# Patient Record
Sex: FEMALE | Race: BLACK OR AFRICAN AMERICAN | NOT HISPANIC OR LATINO
[De-identification: names, ages, dates, MRNs, and addresses within clinical notes are randomized per-mention and may not be internally consistent; named-entity substitution may affect disease eponyms.]

---

## 2018-04-12 ENCOUNTER — APPOINTMENT (OUTPATIENT)
Dept: RADIOLOGY | Facility: CLINIC | Age: 38
End: 2018-04-12

## 2018-04-12 ENCOUNTER — OUTPATIENT (OUTPATIENT)
Dept: OUTPATIENT SERVICES | Facility: HOSPITAL | Age: 38
LOS: 1 days | End: 2018-04-12
Payer: MEDICAID

## 2018-04-12 DIAGNOSIS — Z98.89 OTHER SPECIFIED POSTPROCEDURAL STATES: Chronic | ICD-10-CM

## 2018-04-12 DIAGNOSIS — M25.511 PAIN IN RIGHT SHOULDER: ICD-10-CM

## 2018-04-12 PROCEDURE — 73030 X-RAY EXAM OF SHOULDER: CPT

## 2018-04-12 PROCEDURE — 73030 X-RAY EXAM OF SHOULDER: CPT | Mod: 26,RT

## 2019-12-02 ENCOUNTER — EMERGENCY (EMERGENCY)
Facility: HOSPITAL | Age: 39
LOS: 1 days | Discharge: HOME | End: 2019-12-02
Attending: EMERGENCY MEDICINE | Admitting: EMERGENCY MEDICINE
Payer: MEDICAID

## 2019-12-02 VITALS
HEIGHT: 66 IN | SYSTOLIC BLOOD PRESSURE: 134 MMHG | TEMPERATURE: 98 F | RESPIRATION RATE: 16 BRPM | WEIGHT: 209 LBS | OXYGEN SATURATION: 100 % | HEART RATE: 83 BPM | DIASTOLIC BLOOD PRESSURE: 86 MMHG

## 2019-12-02 DIAGNOSIS — Z98.89 OTHER SPECIFIED POSTPROCEDURAL STATES: Chronic | ICD-10-CM

## 2019-12-02 PROCEDURE — 73080 X-RAY EXAM OF ELBOW: CPT

## 2019-12-02 PROCEDURE — 73030 X-RAY EXAM OF SHOULDER: CPT

## 2019-12-02 PROCEDURE — 99284 EMERGENCY DEPT VISIT MOD MDM: CPT

## 2019-12-02 PROCEDURE — 73080 X-RAY EXAM OF ELBOW: CPT | Mod: 26,LT

## 2019-12-02 PROCEDURE — 99283 EMERGENCY DEPT VISIT LOW MDM: CPT

## 2019-12-02 PROCEDURE — 73030 X-RAY EXAM OF SHOULDER: CPT | Mod: 26,RT

## 2019-12-02 RX ORDER — IBUPROFEN 200 MG
600 TABLET ORAL ONCE
Refills: 0 | Status: COMPLETED | OUTPATIENT
Start: 2019-12-02 | End: 2019-12-02

## 2019-12-02 RX ADMIN — Medication 600 MILLIGRAM(S): at 15:27

## 2019-12-02 RX ADMIN — Medication 600 MILLIGRAM(S): at 15:59

## 2019-12-02 NOTE — ED PROVIDER NOTE - CARE PLAN
Principal Discharge DX:	Bursitis of elbow, unspecified bursa, unspecified laterality Principal Discharge DX:	Bursitis of elbow, unspecified bursa, unspecified laterality  Secondary Diagnosis:	Right shoulder pain, unspecified chronicity

## 2019-12-02 NOTE — ED PROVIDER NOTE - OBJECTIVE STATEMENT
39 yr old female with hx of right shoulder bursitiis, presents with right shoulder and left elbow pain for the last 6 weeks. Pt has not seen ortho. denies any fall or injury. Denies taking any pain medications.

## 2019-12-02 NOTE — ED PROVIDER NOTE - PATIENT PORTAL LINK FT
You can access the FollowMyHealth Patient Portal offered by [facility] by registering at the following website: [email or web address]. By joining [facility] FollowMyHealth portal, you will also be able to view your health information using other applications (apps) compatible with our system.

## 2019-12-02 NOTE — ED PROVIDER NOTE - NSFOLLOWUPINSTRUCTIONS_ED_ALL_ED_FT
1. Ibuprofen 400mg every 6 hours on a full stomach as needed for pain  2. Follow up with ortho in 1-2 days  3. Return to the ED for worsening pain, fevers or any concerns  ************  Bursitis     Bursitis is inflammation and irritation of a bursa, which is one of the small, fluid-filled sacs that cushion and protect the moving parts of your body. These sacs are located between bones and muscles, bones and muscle attachments, or bones and skin areas that are next to bones. A bursa protects those structures from the wear and tear that results from frequent movement.  An inflamed bursa causes pain and swelling. Fluid may build up inside the sac. Bursitis is most common near joints, especially the knees, elbows, hips, and shoulders.  What are the causes?  This condition may be caused by:  Injury from:  A direct hit (blow), like falling on your knee or elbow.Overuse of a joint (repetitive stress).Infection. This can happen if bacteria get into a bursa through a cut or scrape near a joint.Diseases that cause joint inflammation, such as gout and rheumatoid arthritis.What increases the risk?  You are more likely to develop this condition if you:  Have a job or hobby that involves a lot of repetitive stress on your joints.Have a condition that weakens your body's defense system (immune system), such as diabetes, cancer, or HIV.Do any of these often:  Lift and reach overhead.Kneel or lean on hard surfaces.Run or walk.What are the signs or symptoms?  The most common symptoms of this condition include:  Pain that gets worse when you move the affected body part or use it to support (bear) your body weight.Inflammation.Stiffness.Other symptoms include:  Redness.Swelling.Tenderness.Warmth.Pain that continues after rest.Fever or chills. These may occur in bursitis that is caused by infection.How is this diagnosed?  This condition may be diagnosed based on:  Your medical history and a physical exam.Imaging tests, such as an MRI.A procedure to drain fluid from the bursa with a needle (aspiration). The fluid may be checked for signs of infection or gout.Blood tests to rule out other causes of inflammation.How is this treated?  This condition can usually be treated at home with rest, ice, applying pressure (compression), and raising the body part that is affected (elevation). This is called RICE therapy. For mild bursitis, RICE therapy may be all you need. Other treatments may include:  NSAIDs to treat pain and inflammation.Corticosteroid medicines to fight inflammation. These medicines may be injected into and around the area of bursitis.Aspiration of fluid from the bursa to relieve pain and improve movement.Antibiotic medicine to treat an infected bursa.A splint, brace, or walking aid, such as a cane.Physical therapy if you continue to have pain or limited movement.Surgery to remove a damaged or infected bursa. This may be needed if other treatments have not worked.Follow these instructions at home:  Medicines     Take over-the-counter and prescription medicines only as told by your health care provider.If you were prescribed an antibiotic medicine, take it as told by your health care provider. Do not stop taking the antibiotic even if you start to feel better.General instructions        Rest the affected area as told by your health care provider.  If possible, raise (elevate) the affected area above the level of your heart while you are sitting or lying down.Avoid activities that make pain worse.Use splints, braces, pads, or walking aids as told by your health care provider.If directed, put ice on the affected area:  If you have a removable splint or brace, remove it as told by your health care provider.Put ice in a plastic bag.Place a towel between your skin and the bag or between your splint or brace and the bag.Leave the ice on for 20 minutes, 2–3 times a day.Keep all follow-up visits as told by your health care provider. This is important.Preventing future episodes     Take actions to help prevent future episodes of bursitis.  Wear knee pads if you kneel often.Wear sturdy running or walking shoes that fit you well.Take breaks regularly from repetitive activity.Warm up by stretching before doing any activity that takes a lot of effort.Maintain a healthy weight or lose weight as recommended by your health care provider. If you need help doing this, ask your health care provider.Exercise regularly. Start any new physical activity gradually.Contact a health care provider if you:  Have a fever.Have chills.Have bursitis that is not getting better with treatment or home care.Summary  Bursitis is inflammation and irritation of a bursa, which is one of the small, fluid-filled sacs that cushion and protect the moving parts of your body.An inflamed bursa causes pain and swelling.Bursitis is commonly diagnosed with a physical exam, but other tests are sometimes needed.This condition can usually be treated at home with rest, ice, applying pressure (compression), and raising the body part that is affected (elevation). This is called RICE therapy.This information is not intended to replace advice given to you by your health care provider. Make sure you discuss any questions you have with your health care provider.

## 2019-12-02 NOTE — ED PROVIDER NOTE - ATTENDING CONTRIBUTION TO CARE
Dr. Espinoza: I performed a face to face bedside interview with patient regarding history of present illness, review of symptoms and past medical history. I completed an independent physical exam.  I have discussed patient's plan of care with PA.   I agree with note as stated above, having amended the EMR as needed to reflect my findings.   This includes HISTORY OF PRESENT ILLNESS, HIV, PAST MEDICAL/SURGICAL/FAMILY/SOCIAL HISTORY, ALLERGIES AND HOME MEDICATIONS, REVIEW OF SYSTEMS, PHYSICAL EXAM, and any PROGRESS NOTES during the time I functioned as the attending physician for this patient.    see mdm

## 2019-12-02 NOTE — ED ADULT NURSE NOTE - PMH
Anemia    Asthma  last episode 1 year ago , not on meds  Bacterial vaginosis    Fibroid, uterine    HTN (hypertension)  Gestational treated for few months    not on meds  Menorrhagia    Smoker

## 2019-12-02 NOTE — ED PROVIDER NOTE - PHYSICAL EXAMINATION
right shoulder- no point tenderness, positive ROM, positive radial pulse, less than 2 sec cap refill, no warmth, no redness, no signs of infection   left elbow- slight tenderness, positive ROM, positive radial pulse, less than 2 sec cap refill, no warmth, no redness, no signs of infection

## 2019-12-02 NOTE — ED ADULT NURSE NOTE - OBJECTIVE STATEMENT
Pt presents to ED with c/o right shoulder and left elbow pain. Pt states she has a history of bursitis. Pt states she was prescribed naproxen for the pain, but does not take it because she doesn't like the way it makes her feel. She states she has been tolerating the pain for 6 weeks, but the pain has been so severe lately that she wanted to come in for an xray. Denies any fever. No noticeable edema. Able to move elbow, but limited range of motion of right shoulder due to pain.

## 2019-12-02 NOTE — ED PROVIDER NOTE - CARE PROVIDERS DIRECT ADDRESSES
,DirectAddress_Unknown,[email or web address]

## 2019-12-02 NOTE — ED ADULT NURSE NOTE - CHPI ED NUR SYMPTOMS NEG
no tingling/no weakness/no stiffness/no numbness/no fever/no bruising/no deformity/no abrasion/no back pain/no difficulty bearing weight

## 2019-12-02 NOTE — ED PROVIDER NOTE - CARE PROVIDER_API CALL
Zi Marie)  Orthopaedic Surgery  [address] [place] [address]  Phone: [phone]  Fax: [phone]  Follow Up Time:     John Lawler)  Orthopaedic Sports Medicine; Orthopaedic Surgery  [address]  [place] [address]  Phone: [phone]  Fax: [phone]  Follow Up Time:

## 2019-12-02 NOTE — ED PROVIDER NOTE - CLINICAL SUMMARY MEDICAL DECISION MAKING FREE TEXT BOX
Dr. 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 No fevers or chills. Did not take anything for pain because she states naproxen makes her sleepy. On exam pt is well appearing, nad, +diffuse ttp over right shoulder, no bony ttp, +diffuse ttp left elbow with normal ROM. Will get xray, pain ctrl, ortho f/u

## 2019-12-07 DIAGNOSIS — M25.519 PAIN IN UNSPECIFIED SHOULDER: ICD-10-CM

## 2020-03-11 ENCOUNTER — TRANSCRIPTION ENCOUNTER (OUTPATIENT)
Age: 40
End: 2020-03-11

## 2020-05-11 ENCOUNTER — TRANSCRIPTION ENCOUNTER (OUTPATIENT)
Age: 40
End: 2020-05-11

## 2020-11-09 NOTE — ED ADULT NURSE NOTE - DRUG PRE-SCREENING (DAST -1)
"  [facility] EMERGENCY DEPT     Roscoe Asher MD  11/09/20 1645    " Statement Selected

## 2020-11-22 ENCOUNTER — EMERGENCY (EMERGENCY)
Facility: HOSPITAL | Age: 40
LOS: 1 days | Discharge: HOME | End: 2020-11-22
Attending: EMERGENCY MEDICINE
Payer: MEDICAID

## 2020-11-22 VITALS
DIASTOLIC BLOOD PRESSURE: 98 MMHG | HEART RATE: 80 BPM | OXYGEN SATURATION: 100 % | SYSTOLIC BLOOD PRESSURE: 136 MMHG | TEMPERATURE: 98 F | RESPIRATION RATE: 17 BRPM

## 2020-11-22 VITALS
HEIGHT: 66 IN | HEART RATE: 83 BPM | SYSTOLIC BLOOD PRESSURE: 141 MMHG | RESPIRATION RATE: 18 BRPM | DIASTOLIC BLOOD PRESSURE: 85 MMHG | TEMPERATURE: 98 F | OXYGEN SATURATION: 100 % | WEIGHT: 220.02 LBS

## 2020-11-22 DIAGNOSIS — Z98.89 OTHER SPECIFIED POSTPROCEDURAL STATES: Chronic | ICD-10-CM

## 2020-11-22 LAB
ALBUMIN SERPL ELPH-MCNC: 4.2 G/DL — SIGNIFICANT CHANGE UP (ref 3.3–5)
ALP SERPL-CCNC: 71 U/L — SIGNIFICANT CHANGE UP (ref 40–120)
ALT FLD-CCNC: 12 U/L — SIGNIFICANT CHANGE UP (ref 10–45)
ANION GAP SERPL CALC-SCNC: 13 MMOL/L — SIGNIFICANT CHANGE UP (ref 5–17)
APTT BLD: 35.3 SEC — SIGNIFICANT CHANGE UP (ref 27.5–35.5)
AST SERPL-CCNC: 21 U/L — SIGNIFICANT CHANGE UP (ref 10–40)
BASOPHILS # BLD AUTO: 0.02 K/UL — SIGNIFICANT CHANGE UP (ref 0–0.2)
BASOPHILS NFR BLD AUTO: 0.3 % — SIGNIFICANT CHANGE UP (ref 0–2)
BILIRUB SERPL-MCNC: 0.2 MG/DL — SIGNIFICANT CHANGE UP (ref 0.2–1.2)
BUN SERPL-MCNC: 15 MG/DL — SIGNIFICANT CHANGE UP (ref 7–23)
CALCIUM SERPL-MCNC: 9.6 MG/DL — SIGNIFICANT CHANGE UP (ref 8.4–10.5)
CHLORIDE SERPL-SCNC: 103 MMOL/L — SIGNIFICANT CHANGE UP (ref 96–108)
CO2 SERPL-SCNC: 24 MMOL/L — SIGNIFICANT CHANGE UP (ref 22–31)
CREAT SERPL-MCNC: 0.81 MG/DL — SIGNIFICANT CHANGE UP (ref 0.5–1.3)
EOSINOPHIL # BLD AUTO: 0.18 K/UL — SIGNIFICANT CHANGE UP (ref 0–0.5)
EOSINOPHIL NFR BLD AUTO: 2.3 % — SIGNIFICANT CHANGE UP (ref 0–6)
GLUCOSE SERPL-MCNC: 85 MG/DL — SIGNIFICANT CHANGE UP (ref 70–99)
HCG SERPL-ACNC: <2 MIU/ML — SIGNIFICANT CHANGE UP
HCT VFR BLD CALC: 38 % — SIGNIFICANT CHANGE UP (ref 34.5–45)
HGB BLD-MCNC: 12.4 G/DL — SIGNIFICANT CHANGE UP (ref 11.5–15.5)
IMM GRANULOCYTES NFR BLD AUTO: 0.3 % — SIGNIFICANT CHANGE UP (ref 0–1.5)
INR BLD: 1.1 RATIO — SIGNIFICANT CHANGE UP (ref 0.88–1.16)
LYMPHOCYTES # BLD AUTO: 3.23 K/UL — SIGNIFICANT CHANGE UP (ref 1–3.3)
LYMPHOCYTES # BLD AUTO: 40.5 % — SIGNIFICANT CHANGE UP (ref 13–44)
MCHC RBC-ENTMCNC: 28.1 PG — SIGNIFICANT CHANGE UP (ref 27–34)
MCHC RBC-ENTMCNC: 32.6 GM/DL — SIGNIFICANT CHANGE UP (ref 32–36)
MCV RBC AUTO: 86.2 FL — SIGNIFICANT CHANGE UP (ref 80–100)
MONOCYTES # BLD AUTO: 0.53 K/UL — SIGNIFICANT CHANGE UP (ref 0–0.9)
MONOCYTES NFR BLD AUTO: 6.6 % — SIGNIFICANT CHANGE UP (ref 2–14)
NEUTROPHILS # BLD AUTO: 3.99 K/UL — SIGNIFICANT CHANGE UP (ref 1.8–7.4)
NEUTROPHILS NFR BLD AUTO: 50 % — SIGNIFICANT CHANGE UP (ref 43–77)
NRBC # BLD: 0 /100 WBCS — SIGNIFICANT CHANGE UP (ref 0–0)
PLATELET # BLD AUTO: 297 K/UL — SIGNIFICANT CHANGE UP (ref 150–400)
POTASSIUM SERPL-MCNC: 4.3 MMOL/L — SIGNIFICANT CHANGE UP (ref 3.5–5.3)
POTASSIUM SERPL-SCNC: 4.3 MMOL/L — SIGNIFICANT CHANGE UP (ref 3.5–5.3)
PROT SERPL-MCNC: 7.2 G/DL — SIGNIFICANT CHANGE UP (ref 6–8.3)
PROTHROM AB SERPL-ACNC: 13.1 SEC — SIGNIFICANT CHANGE UP (ref 10.6–13.6)
RBC # BLD: 4.41 M/UL — SIGNIFICANT CHANGE UP (ref 3.8–5.2)
RBC # FLD: 13.1 % — SIGNIFICANT CHANGE UP (ref 10.3–14.5)
SODIUM SERPL-SCNC: 140 MMOL/L — SIGNIFICANT CHANGE UP (ref 135–145)
WBC # BLD: 7.97 K/UL — SIGNIFICANT CHANGE UP (ref 3.8–10.5)
WBC # FLD AUTO: 7.97 K/UL — SIGNIFICANT CHANGE UP (ref 3.8–10.5)

## 2020-11-22 PROCEDURE — 85025 COMPLETE CBC W/AUTO DIFF WBC: CPT

## 2020-11-22 PROCEDURE — 85610 PROTHROMBIN TIME: CPT

## 2020-11-22 PROCEDURE — 85730 THROMBOPLASTIN TIME PARTIAL: CPT

## 2020-11-22 PROCEDURE — 93975 VASCULAR STUDY: CPT | Mod: 26

## 2020-11-22 PROCEDURE — 76830 TRANSVAGINAL US NON-OB: CPT | Mod: 26

## 2020-11-22 PROCEDURE — 99284 EMERGENCY DEPT VISIT MOD MDM: CPT

## 2020-11-22 PROCEDURE — 99285 EMERGENCY DEPT VISIT HI MDM: CPT

## 2020-11-22 PROCEDURE — 76830 TRANSVAGINAL US NON-OB: CPT

## 2020-11-22 PROCEDURE — 84702 CHORIONIC GONADOTROPIN TEST: CPT

## 2020-11-22 PROCEDURE — 93975 VASCULAR STUDY: CPT

## 2020-11-22 PROCEDURE — 80053 COMPREHEN METABOLIC PANEL: CPT

## 2020-11-22 NOTE — ED PROVIDER NOTE - PROGRESS NOTE DETAILS
Pt h/h stable. US revealed fibroids as pt expected. HAs GYN appt 12/3 but advised to call early next week to discuss ED visit and results and expedite follow up. Pt endorses understanding and agrees  Saskia Shepherd PA-C

## 2020-11-22 NOTE — ED PROVIDER NOTE - ATTENDING CONTRIBUTION TO CARE
41 y/o f with pmhx LMP presents for 3 weeks of vaginal bleeding associated with crampy lower abd pain.   . using approx 3-4 pads today with clots. no fever or chills. no back pain . no  urinary symptoms. 39 y/o f with pmhx  presents for approx 3 weeks of vaginal bleeding associated with crampy lower abd pain. Patient had fibroids in past and feels similar. Has checked home pregnancy test 4 times and has been negative each time. bleeding had stopped for 5 days and then started again. Pt had myomectomy in the past for similar bleeding. She is sexually active and trying to conceive. + fatigue. no shortness of breath. using approx 3-4 pads today with clots. no fever or chills. no back pain . no  urinary symptoms.  Gen.  well appearing female. no acute distress  HEENT:  perrl eomi mmm  Lungs:  b/l bs no crackles / wheezing / rhonchi  CVS: S1S2   Abd;  soft non distended. no tenderness, min suprapubic tenderness to palp. no cva tenderness  Gu exam done by RONNY Boland  Ext: no pitting edema no erythema  Neuro: aaox3 no focal deficits  MSK:strength 5/5 b/l upper and lower ext

## 2020-11-22 NOTE — ED PROVIDER NOTE - OBJECTIVE STATEMENT
40 year old female with pmhx uterine fibroids s/p Fibroid Uterus, Myomectomy presents to ED c/o vaginal bleeding and abdominal cramping x 3 weeks. Pt states that her LMP was about a month ago, bleed for approx 5 days. Bleeding stopped for approx 5 days and then began spotting. Since bleeding has worsened reports using 3 pads a day w/ passage of clots. Saw her GYN 2 weeks ago for breast pain and has outpt mammo scheduled. Pt actively trying to get pregnant but has had 3 negative pregnancy tests. Reports has not had issues 40 year old female with pmhx uterine fibroids s/p Fibroid Uterus, Myomectomy presents to ED c/o vaginal bleeding and abdominal cramping x 3 weeks. Pt states that her LMP was about a month ago, bleed for approx 5 days. Bleeding stopped for approx 5 days and then began spotting. Since bleeding has worsened reports using 3 pads a day w/ passage of clots. Saw her GYN 2 weeks ago for breast pain and has outpt mammo scheduled. Pt actively trying to get pregnant but has had 3 negative pregnancy tests. Reports has not had issues with fibroids in many years. Reports has been feeling fatigued. Denies fevers, chills, chest pain, sob, n/v/d, urinary symptoms

## 2020-11-22 NOTE — ED PROVIDER NOTE - PATIENT PORTAL LINK FT
You can access the FollowMyHealth Patient Portal offered by [facility] by registering at the following website: [email or web address]. By joining [employer]’s FollowMyHealth portal, you will also be able to view your health information using other applications (apps) compatible with our system.

## 2020-11-22 NOTE — ED ADULT NURSE NOTE - OBJECTIVE STATEMENT
40 yr old female came in with vag bleeding for 3 weeks. has a h/o fibroids with 3 operations in the past. was pregnant but had a miscarriage at 4 months. actively trying to have a baby now, thought she was maybe having another miscarriage but 3 prg tests at different times show she in not. on assessment a and o x 3 lungs clear abd soft non tender no swelling in extremities no n/v/d no fevers no other complaints or issues.

## 2020-11-22 NOTE — ED PROVIDER NOTE - NSFOLLOWUPINSTRUCTIONS_ED_ALL_ED_FT
1. Please call your GYN in the next 1-2 days to discuss ED visit, symptoms and follow up. Discuss expediting follow up to be seen sooner that December 3  2. Rest and stay hydrated  3. Return to ED for change of symptoms including increased pain, heavier bleeding, dizziness, lightheadedness, weakness and all other concerns

## 2020-11-22 NOTE — ED PROVIDER NOTE - PHYSICAL EXAMINATION
CONSTITUTIONAL: Patient is awake, alert and oriented x 3. Patient is well appearing and in no acute distress.  HEAD: NCAT,   NECK: supple, FROM  LUNGS: CTA B/L  HEART: RRR.+S1S2 no murmurs,   ABDOMEN: Soft nd, slight suprapubic ttp otherwise nttp, no rebound or guarding.   PELVIC: (chaperone JOHNATHAN Lou) No vulvar lesions, On speculum there is small amount of blood in vaginal vault. No CMT, no uterine ttp, no adnexal ttp b/l   EXTREMITY: no edema or calf tenderness b/l, FROM upper and lower ext b/l  SKIN: with no rash or lesions  NEURO: No focal deficits

## 2020-11-29 ENCOUNTER — TRANSCRIPTION ENCOUNTER (OUTPATIENT)
Age: 40
End: 2020-11-29

## 2020-12-03 ENCOUNTER — APPOINTMENT (OUTPATIENT)
Dept: ULTRASOUND IMAGING | Facility: CLINIC | Age: 40
End: 2020-12-03
Payer: MEDICAID

## 2020-12-03 ENCOUNTER — APPOINTMENT (OUTPATIENT)
Dept: MAMMOGRAPHY | Facility: CLINIC | Age: 40
End: 2020-12-03
Payer: MEDICAID

## 2020-12-03 ENCOUNTER — OUTPATIENT (OUTPATIENT)
Dept: OUTPATIENT SERVICES | Facility: HOSPITAL | Age: 40
LOS: 1 days | End: 2020-12-03
Payer: MEDICAID

## 2020-12-03 DIAGNOSIS — Z00.00 ENCOUNTER FOR GENERAL ADULT MEDICAL EXAMINATION WITHOUT ABNORMAL FINDINGS: ICD-10-CM

## 2020-12-03 DIAGNOSIS — Z12.31 ENCOUNTER FOR SCREENING MAMMOGRAM FOR MALIGNANT NEOPLASM OF BREAST: ICD-10-CM

## 2020-12-03 DIAGNOSIS — Z00.8 ENCOUNTER FOR OTHER GENERAL EXAMINATION: ICD-10-CM

## 2020-12-03 DIAGNOSIS — Z98.89 OTHER SPECIFIED POSTPROCEDURAL STATES: Chronic | ICD-10-CM

## 2020-12-03 PROCEDURE — 76641 ULTRASOUND BREAST COMPLETE: CPT | Mod: 26,50

## 2020-12-03 PROCEDURE — 77066 DX MAMMO INCL CAD BI: CPT | Mod: 26

## 2020-12-03 PROCEDURE — 77066 DX MAMMO INCL CAD BI: CPT

## 2020-12-03 PROCEDURE — G0279: CPT | Mod: 26

## 2020-12-03 PROCEDURE — G0279: CPT

## 2020-12-03 PROCEDURE — 76641 ULTRASOUND BREAST COMPLETE: CPT

## 2021-01-19 ENCOUNTER — TRANSCRIPTION ENCOUNTER (OUTPATIENT)
Age: 41
End: 2021-01-19

## 2021-04-24 ENCOUNTER — TRANSCRIPTION ENCOUNTER (OUTPATIENT)
Age: 41
End: 2021-04-24

## 2021-07-12 ENCOUNTER — TRANSCRIPTION ENCOUNTER (OUTPATIENT)
Age: 41
End: 2021-07-12

## 2022-05-10 ENCOUNTER — EMERGENCY (EMERGENCY)
Facility: HOSPITAL | Age: 42
LOS: 1 days | Discharge: HOME | End: 2022-05-10
Attending: EMERGENCY MEDICINE
Payer: COMMERCIAL

## 2022-05-10 VITALS
DIASTOLIC BLOOD PRESSURE: 89 MMHG | SYSTOLIC BLOOD PRESSURE: 121 MMHG | TEMPERATURE: 98 F | OXYGEN SATURATION: 100 % | WEIGHT: 229.94 LBS | HEART RATE: 99 BPM | HEIGHT: 66 IN | RESPIRATION RATE: 18 BRPM

## 2022-05-10 VITALS
TEMPERATURE: 99 F | DIASTOLIC BLOOD PRESSURE: 84 MMHG | RESPIRATION RATE: 18 BRPM | SYSTOLIC BLOOD PRESSURE: 154 MMHG | HEART RATE: 61 BPM | OXYGEN SATURATION: 100 %

## 2022-05-10 DIAGNOSIS — Z98.89 OTHER SPECIFIED POSTPROCEDURAL STATES: Chronic | ICD-10-CM

## 2022-05-10 PROCEDURE — 72131 CT LUMBAR SPINE W/O DYE: CPT | Mod: 26,MA

## 2022-05-10 PROCEDURE — 72131 CT LUMBAR SPINE W/O DYE: CPT | Mod: MA

## 2022-05-10 PROCEDURE — 99284 EMERGENCY DEPT VISIT MOD MDM: CPT | Mod: 25

## 2022-05-10 PROCEDURE — 99285 EMERGENCY DEPT VISIT HI MDM: CPT

## 2022-05-10 PROCEDURE — 72125 CT NECK SPINE W/O DYE: CPT | Mod: 26,MA

## 2022-05-10 PROCEDURE — 73110 X-RAY EXAM OF WRIST: CPT

## 2022-05-10 PROCEDURE — 73110 X-RAY EXAM OF WRIST: CPT | Mod: 26,LT

## 2022-05-10 PROCEDURE — 72125 CT NECK SPINE W/O DYE: CPT | Mod: MA

## 2022-05-10 RX ORDER — IBUPROFEN 200 MG
600 TABLET ORAL ONCE
Refills: 0 | Status: COMPLETED | OUTPATIENT
Start: 2022-05-10 | End: 2022-05-10

## 2022-05-10 RX ORDER — DIAZEPAM 5 MG
1 TABLET ORAL
Qty: 12 | Refills: 0
Start: 2022-05-10 | End: 2022-05-13

## 2022-05-10 RX ORDER — LIDOCAINE 4 G/100G
1 CREAM TOPICAL ONCE
Refills: 0 | Status: COMPLETED | OUTPATIENT
Start: 2022-05-10 | End: 2022-05-10

## 2022-05-10 RX ORDER — ACETAMINOPHEN 500 MG
975 TABLET ORAL ONCE
Refills: 0 | Status: COMPLETED | OUTPATIENT
Start: 2022-05-10 | End: 2022-05-10

## 2022-05-10 RX ADMIN — LIDOCAINE 1 PATCH: 4 CREAM TOPICAL at 11:45
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
## 2022-05-10 NOTE — ED PROVIDER NOTE - PROGRESS NOTE DETAILS
Judi HAYES PGY-2: Pt was re-evaluated at bedside, VSS, feeling well overall. Return precautions and follow up plan with PCP and/or specialist were discussed. Time was taken to answer any questions that the patient had before providing them with discharge paperwork.
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
## 2022-05-10 NOTE — ED PROVIDER NOTE - NSICDXPASTMEDICALHX_GEN_ALL_CORE_FT
PAST MEDICAL HISTORY:  Anemia     Asthma last episode 1 year ago , not on meds    Bacterial vaginosis     Fibroid, uterine     HTN (hypertension) Gestational treated for few months    not on meds    Menorrhagia     Smoker
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
## 2022-05-10 NOTE — ED PROVIDER NOTE - NSFOLLOWUPCLINICS_GEN_ALL_ED_FT
[facility] - [facility]  Ortho/Spine  [address] [place] [address]  Phone: [phone]  Fax:
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
## 2022-05-10 NOTE — ED PROVIDER NOTE - PHYSICAL EXAMINATION
A primary and secondary survey was performed.   \Airway: oropharynx clear  Breathing: normal breath sounds bilaterally, pt phonating well  Circulation: Mentation normal, pulses palpated in all 4 limbs, no obvious active external hemorrhage, lungs/abd/pelvis/legs wo signs of blood accumulation.   Disability: Neuro intact / pupils equal round reactive.   Exposure: No external signs of trauma. C-spine and lumbar midline ttp.     Gen: Well appearing not in distress.   Head: NC,AT. No conti sign/raccoon eyes.   ENT: No hemoTM, oropharynx as above, no nasal hemorrhage.   Neck: as above.  Chest: Lung exam- CTAB, no ttp in chest wall.   Cardiac: RRR S1S2, No JVD. Abd: soft, non-tender. Normal in color.   Pelvis: Stable.   Ext: +ttp dorsal aspect L wrist. no ttp anatomic snuffbox, no swelling or gross deformities noted   Skin: No Abrasions or lacerations.   Neuro: GCS 15 , Moving 4 limbs, Speech fluid and clear, able to ambulate.   Psych: Normal affect, judgment. A primary and secondary survey was performed.   \Airway: oropharynx clear  Breathing: normal breath sounds bilaterally, pt phonating well  Circulation: Mentation normal, pulses palpated in all 4 limbs, no obvious active external hemorrhage, lungs/abd/pelvis/legs wo signs of blood accumulation.   Disability: Neuro intact / pupils equal round reactive.   Exposure: No external signs of trauma. C-spine and lumbar midline ttp. No step off or defomity     Gen: Well appearing not in distress.   Head: NC,AT. No conti sign/raccoon eyes.   EENT: Perrl eomi  No hemoTM, oropharynx as above, no nasal hemorrhage.   Neck: as above.  Chest: Lung exam- CTAB, no ttp in chest wall.   Cardiac: RRR S1S2, No JVD. Abd: soft, non-tender. Normal in color.   Pelvis: Stable.   Ext: +ttp dorsal aspect L wrist. no ttp anatomic snuffbox, no swelling or gross deformities noted   Skin: No Abrasions or lacerations.   Neuro: GCS 15 , Moving 4 limbs, Speech fluid and clear, able to ambulate.   Psych: Normal affect, judgment.
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
## 2022-05-10 NOTE — ED ADULT NURSE NOTE - NSIMPLEMENTINTERV_GEN_ALL_ED
Implemented All Universal Safety Interventions:  [employer] to call system. Call bell, personal items and telephone within reach. Instruct patient to call for assistance. Room bathroom lighting operational. Non-slip footwear when patient is off stretcher. Physically safe environment: no spills, clutter or unnecessary equipment. Stretcher in lowest position, wheels locked, appropriate side rails in place.

## 2022-05-10 NOTE — ED PROVIDER NOTE - PATIENT PORTAL LINK FT
You can access the FollowMyHealth Patient Portal offered by [facility] by registering at the following website: [email or web address]. By joining [facility]’s FollowMyHealth portal, you will also be able to view your health information using other applications (apps) compatible with our system.

## 2023-08-15 NOTE — ED ADULT TRIAGE NOTE - MEANS OF ARRIVAL
Elida Carson (:  1939) is a Established patient, presenting virtually for evaluation of the following:    Assessment & Plan   Below is the assessment and plan developed based on review of pertinent history, physical exam, labs, studies, and medications. 1. Reactive depression  -     PARoxetine (PAXIL) 20 MG tablet; Take 1 tablet by mouth daily, Disp-30 tablet, R-11Normal  -     LORazepam (ATIVAN) 0.5 MG tablet; Take 1 tablet by mouth every 8 hours as needed for Anxiety for up to 30 days. Max Daily Amount: 1.5 mg, Disp-60 tablet, R-0Normal  2. Anxiety  -     PARoxetine (PAXIL) 20 MG tablet; Take 1 tablet by mouth daily, Disp-30 tablet, R-11Normal  -     LORazepam (ATIVAN) 0.5 MG tablet; Take 1 tablet by mouth every 8 hours as needed for Anxiety for up to 30 days. Max Daily Amount: 1.5 mg, Disp-60 tablet, R-0Normal    Return if symptoms worsen or fail to improve.      Discussed with him he needs to start on medications  Also discussed after he starting to feel better maybe next week or the following it is an absolute must that he does go to cardiac rehab if he is not feeling better over the next 2 weeks he needs to follow-up with me  Subjective   HPI  Review of Systems   He is being seen today postop cardiothoracic surgery for bypass  Unfortunately he is feeling tremendously depressed and anxious short tempered and decreased appetite he has lost over 16 pounds he is just mentally not doing well with the check on mortality after having gone through this  He has not yet gone to cardiac rehab he is taking his medications but he is feeling extremely sad and overwhelmed    Objective   Patient-Reported Vitals  No data recorded     Physical Exam  [INSTRUCTIONS:  \"[x]\" Indicates a positive item  \"[]\" Indicates a negative item  -- DELETE ALL ITEMS NOT EXAMINED]    Constitutional: [x] Appears well-developed and well-nourished [x] No apparent distress      [] Abnormal -     Mental status: [x] Alert and awake  [x] ambulatory

## 2024-07-31 ENCOUNTER — NON-APPOINTMENT (OUTPATIENT)
Age: 44
End: 2024-07-31

## 2024-09-02 ENCOUNTER — NON-APPOINTMENT (OUTPATIENT)
Age: 44
End: 2024-09-02

## 2024-11-17 ENCOUNTER — EMERGENCY (EMERGENCY)
Facility: HOSPITAL | Age: 44
LOS: 1 days | Discharge: HOME | End: 2024-11-17
Attending: EMERGENCY MEDICINE
Payer: COMMERCIAL

## 2024-11-17 ENCOUNTER — NON-APPOINTMENT (OUTPATIENT)
Age: 44
End: 2024-11-17

## 2024-11-17 VITALS
DIASTOLIC BLOOD PRESSURE: 88 MMHG | OXYGEN SATURATION: 100 % | HEART RATE: 65 BPM | RESPIRATION RATE: 17 BRPM | SYSTOLIC BLOOD PRESSURE: 144 MMHG

## 2024-11-17 VITALS
DIASTOLIC BLOOD PRESSURE: 98 MMHG | WEIGHT: 223.11 LBS | RESPIRATION RATE: 16 BRPM | HEART RATE: 78 BPM | OXYGEN SATURATION: 100 % | SYSTOLIC BLOOD PRESSURE: 163 MMHG | TEMPERATURE: 99 F | HEIGHT: 66 IN

## 2024-11-17 DIAGNOSIS — Z98.89 OTHER SPECIFIED POSTPROCEDURAL STATES: Chronic | ICD-10-CM

## 2024-11-17 LAB
ANION GAP SERPL CALC-SCNC: 12 MMOL/L — SIGNIFICANT CHANGE UP (ref 5–17)
BUN SERPL-MCNC: 8 MG/DL — SIGNIFICANT CHANGE UP (ref 7–23)
CALCIUM SERPL-MCNC: 9.1 MG/DL — SIGNIFICANT CHANGE UP (ref 8.4–10.5)
CHLORIDE SERPL-SCNC: 99 MMOL/L — SIGNIFICANT CHANGE UP (ref 96–108)
CO2 SERPL-SCNC: 26 MMOL/L — SIGNIFICANT CHANGE UP (ref 22–31)
CREAT SERPL-MCNC: 0.77 MG/DL — SIGNIFICANT CHANGE UP (ref 0.5–1.3)
EGFR: 98 ML/MIN/1.73M2 — SIGNIFICANT CHANGE UP
GLUCOSE SERPL-MCNC: 91 MG/DL — SIGNIFICANT CHANGE UP (ref 70–99)
POTASSIUM SERPL-MCNC: 3.7 MMOL/L — SIGNIFICANT CHANGE UP (ref 3.5–5.3)
POTASSIUM SERPL-SCNC: 3.7 MMOL/L — SIGNIFICANT CHANGE UP (ref 3.5–5.3)
SODIUM SERPL-SCNC: 137 MMOL/L — SIGNIFICANT CHANGE UP (ref 135–145)

## 2024-11-17 PROCEDURE — 93005 ELECTROCARDIOGRAM TRACING: CPT

## 2024-11-17 PROCEDURE — 99284 EMERGENCY DEPT VISIT MOD MDM: CPT

## 2024-11-17 PROCEDURE — 80048 BASIC METABOLIC PNL TOTAL CA: CPT

## 2024-11-17 PROCEDURE — 99283 EMERGENCY DEPT VISIT LOW MDM: CPT | Mod: 25

## 2024-11-17 NOTE — ED PROVIDER NOTE - NSFOLLOWUPINSTRUCTIONS_ED_ALL_ED_FT
See your Primary Doctor this week for follow up -- call to discuss..    Stay well hydrated, eat low salt/sodium diet, get plenty of rest, minimize stress.  STOP SMOKING.    See HYPERTENSION information and return instructions given to you.    Seek immediate medical care for new/worsening symptoms/concerns.

## 2024-11-17 NOTE — ED PROVIDER NOTE - PHYSICAL EXAMINATION
Well Appearing, Nontoxic, NAD;  Symm Facies, PERRL 3mm, (-)Pallor, Anicteric, MMM;  RRR w/o m/g/r, equal distal pulses;   CTAB w/o distress;   Abd soft, nt/nd, +bs;  No edema/calf tender;  No rash;  AOX3, Normal speech, normal strength/sensation/gait

## 2024-11-17 NOTE — ED PROVIDER NOTE - CARE PLAN
Principal Discharge DX:	Abscess of intraoral region   1 Principal Discharge DX:	Asymptomatic hypertension

## 2024-11-17 NOTE — ED PROVIDER NOTE - CLINICAL SUMMARY MEDICAL DECISION MAKING FREE TEXT BOX
see MD Note ------------ATTENDING NOTE------------  pt describing elevated blood pressure readings today at Urgent Care (following up an ear infection) and OBGYN (following up fibroids), SBP 130s- 150s, no additional complaints apart from anxiety, pt only agreeing to EKG and butterfly blood draw, overall benign exam, pt very well appearing, in depth dw pt about ddx, tx, becker, continued close outpt fu.  = Dao Butler MD   ------------------------------------------------------ see MD note

## 2024-11-17 NOTE — ED ADULT NURSE NOTE - NSHOSCREENINGQ1_ED_ALL_ED
No
Products Recommended: [employer], [employer] or Eucerin sunscreen
General Sunscreen Counseling: I recommended a broad spectrum sunscreen with a SPF of 30 or higher. I explained that SPF 30 sunscreens block approximately 97 percent of the sun's harmful rays. Sunscreens should be applied at least 15 minutes prior to expected sun exposure and then every 2 hours after that as long as sun exposure continues. If swimming or exercising sunscreen should be reapplied every 45 minutes to an hour after getting wet or sweating. One ounce, or the equivalent of a shot glass full of sunscreen, is adequate to protect the skin not covered by a bathing suit. I also recommended a lip balm with a sunscreen as well. Sun protective clothing can be used in lieu of sunscreen but must be worn the entire time you are exposed to the sun's rays.
Detail Level: Detailed

## 2024-11-17 NOTE — ED ADULT TRIAGE NOTE - CHIEF COMPLAINT QUOTE
HTN starting today, Went to urgent care to check on ear infection and saw the pressure was high there. Denies headache, dizziness,

## 2024-11-17 NOTE — ED PROVIDER NOTE - ATTENDING APP SHARED VISIT CONTRIBUTION OF CARE
------------ATTENDING NOTE------------  pt describing elevated blood pressure readings today at Urgent Care (following up an ear infection) and OBGYN (following up fibroids), SBP 130s- 150s, no additional complaints apart from anxiety, pt only agreeing to EKG and butterfly blood draw, overall benign exam, pt very well appearing, in depth dw pt about ddx, tx, becker, continued close outpt fu.  = Dao Butler MD   ------------------------------------------------------

## 2024-11-17 NOTE — ED ADULT NURSE NOTE - OBJECTIVE STATEMENT
45 y/o F presents to ED complaining of hypertension. Pt reports she was at urgent care for an ear infection and they noticed her blood pressure to be elevated so came to ED for evaluation. Upon arrival, pt is A&OX4, satting well on RA. Afebrile orally. Well appearing. Full strength and sensation in all extremities. Denies headache, dizziness, vision changes, chest pain, shortness of breath, abdominal pain, nausea, vomiting, diarrhea, fevers, chills, dysuria, hematuria, recent illness travel or fall.

## 2024-11-17 NOTE — ED PROVIDER NOTE - ATTENDING CONTRIBUTION TO CARE
------------ATTENDING NOTE------------  pt w/  c/o several days of increasing swelling/pain in R premolar mandibular area that has spread to submental area, subjective fevers, pain w/ swallowing, no trismus or change in phonation, no fevers, no sob/difficulty breathing, no chest pain/discomfort, awaiting labs/imaging / ENT consult -->  - Dao Butler MD   ---------------------------------------------------------

## 2024-11-17 NOTE — ED PROVIDER NOTE - PATIENT PORTAL LINK FT
You can access the FollowMyHealth Patient Portal offered by [facility] by registering at the following website: [email or web address]. By joining [facility]’s FollowMyHealth portal, you will also be able to view your health information using other applications (apps) compatible with our system.

## 2025-01-11 ENCOUNTER — NON-APPOINTMENT (OUTPATIENT)
Age: 45
End: 2025-01-11

## 2025-01-23 ENCOUNTER — NON-APPOINTMENT (OUTPATIENT)
Age: 45
End: 2025-01-23

## 2025-03-13 NOTE — ED ADULT NURSE NOTE - ABDOMEN
RM 7     Chief Complaint   Patient presents with    Medicare AWV     Pt is here for her Annual Medicare Wellness. Pt states she is concerned about her weight loss.       Vitals:    03/13/25 1544   BP: 119/75   Pulse: 77   SpO2: 99%            No data to display                \"Have you been to the ER, urgent care clinic since your last visit?  Hospitalized since your last visit?\"    NO    “Have you seen or consulted any other health care providers outside of [facility] since your last visit?”    NO            Click Here for Release of Records Request   AVS  education, follow up, and recommendations provided and addressed with patient.    Arm   Patient Position: Sitting   Pulse: 77   SpO2: 99%   Weight: 50.3 kg (111 lb)   Height: 1.626 m (5' 4\")      Body mass index is 19.05 kg/m².      Physical Exam  Vitals and nursing note reviewed.   Constitutional:       General: She is not in acute distress.     Appearance: Normal appearance.   Cardiovascular:      Rate and Rhythm: Normal rate and regular rhythm.      Pulses: Normal pulses.      Heart sounds: Normal heart sounds.   Pulmonary:      Effort: Pulmonary effort is normal. No respiratory distress.      Breath sounds: Normal breath sounds.   Musculoskeletal:      Right lower leg: No edema.      Left lower leg: No edema.   Neurological:      Mental Status: She is alert.                 Allergies   Allergen Reactions    Tramadol Hives and Itching     Prior to Visit Medications    Medication Sig Taking? Authorizing Provider   HYDROcodone-acetaminophen (NORCO) 5-325 MG per tablet Take 1 tablet by mouth every 6 hours as needed for Pain for up to 1 day. Intended supply: 3 days. Take lowest dose possible to manage pain Max Daily Amount: 4 tablets Yes Anuj Tesfaye MD   amoxicillin-clavulanate (AUGMENTIN) 875-125 MG per tablet Take 1 tablet by mouth in the morning and 1 tablet in the evening. Do all this for 14 days. for 10 days. Yes Anuj Tesfaye MD   ibuprofen (ADVIL;MOTRIN) 800 MG tablet Take 1 tablet by mouth as needed  Patient not taking: Reported on 3/13/2025  ProviderRainer MD   ondansetron (ZOFRAN-ODT) 4 MG disintegrating tablet Take 1 tablet by mouth 3 times daily as needed for Nausea or Vomiting  Patient not taking: Reported on 3/13/2025  Otto Angel MD   dicyclomine (BENTYL) 10 MG capsule Take 1 capsule by mouth 4 times daily (before meals and nightly)  Patient not taking: Reported on 3/13/2025  Otto Angel MD   Multiple Vitamins-Minerals (MULTIVITAMIN WITH MINERALS) tablet Take 1 tablet by mouth daily  Patient not taking: Reported on 3/13/2025  Anuj Tesfaye MD   famotidine (PEPCID) 20  soft